# Patient Record
Sex: FEMALE | Race: OTHER | HISPANIC OR LATINO | URBAN - METROPOLITAN AREA
[De-identification: names, ages, dates, MRNs, and addresses within clinical notes are randomized per-mention and may not be internally consistent; named-entity substitution may affect disease eponyms.]

---
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
## 2023-06-30 ENCOUNTER — APPOINTMENT (OUTPATIENT)
Dept: ORTHOPEDIC SURGERY | Facility: CLINIC | Age: 23
End: 2023-06-30
Payer: COMMERCIAL

## 2023-06-30 ENCOUNTER — TRANSCRIPTION ENCOUNTER (OUTPATIENT)
Age: 23
End: 2023-06-30

## 2023-06-30 VITALS — BODY MASS INDEX: 18.43 KG/M2 | WEIGHT: 104 LBS | HEIGHT: 63 IN

## 2023-06-30 PROCEDURE — 72100 X-RAY EXAM L-S SPINE 2/3 VWS: CPT

## 2023-06-30 PROCEDURE — 73502 X-RAY EXAM HIP UNI 2-3 VIEWS: CPT

## 2023-06-30 PROCEDURE — 99214 OFFICE O/P EST MOD 30 MIN: CPT

## 2023-06-30 NOTE — HISTORY OF PRESENT ILLNESS
[8] : 8 [5] : 5 [Dull/Aching] : dull/aching [Localized] : localized [Constant] : constant [Standing] : standing [Walking] : walking [Stairs] : stairs [Student] : Work status: student [de-identified] : 23F here with low back and bilateral hip pain. She states pain has been present since Jan 2023. She has pain in her groin region. She hears clicking in the groins. She has a hx of lumbar issues.\par \par  [] : Post Surgical Visit: no [FreeTextEntry1] : gisella hips [FreeTextEntry5] : Patient complains of gisella hip pain since january 2023, has pain with walkings, has clicking, states it pops in and out when she is walking, pain getting worse. has back pain, h/o stress fracture in the back in the past

## 2023-06-30 NOTE — IMAGING
[de-identified] : Back / Spine:\par Inspection: No erythema and ecchymosis.\par Palpation: b/l lumbar paraspinal tenderness. \par Range of motion:  Near full range of motion but with mild stiffness. \par Strength Testing: motor exam is 5/5 throughout both lower extremities with normal tone\par Neurological testing: light touch is intact throughout both lower extremities\par Straight Leg Raise: neg\par Babiniski test: neg bilaterally\par \par \par Land R hip\par no swelling or deformity\par pain in groin and GT region\par + impingement testing

## 2023-07-12 ENCOUNTER — APPOINTMENT (OUTPATIENT)
Dept: ORTHOPEDIC SURGERY | Facility: CLINIC | Age: 23
End: 2023-07-12
Payer: COMMERCIAL

## 2023-07-12 VITALS — WEIGHT: 104 LBS | HEIGHT: 63 IN | BODY MASS INDEX: 18.43 KG/M2

## 2023-07-12 PROCEDURE — 99214 OFFICE O/P EST MOD 30 MIN: CPT

## 2023-07-12 NOTE — HISTORY OF PRESENT ILLNESS
[Sudden] : sudden [6] : 6 [3] : 3 [Dull/Aching] : dull/aching [Throbbing] : throbbing [Frequent] : frequent [Meds] : meds [de-identified] : This is Ms. ROSS BONE  a 23 year old female who comes in today complaining of bilateral hip pain since Jan 2023 with no injury.  She saw [facility]AS on 6/30/23 at our urgent care.  Pain is Bl hip lateral pain and tightness. Feels like the hip wants to pop out randomly while walking. Tried acupuncture/ stretching with no help. Has felt somewhat better recently as she has been resting more. 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 [] : no [FreeTextEntry9] : advil/stretching [de-identified] : [facility] [de-identified] : xrays

## 2023-07-12 NOTE — DISCUSSION/SUMMARY
[de-identified] : hip impingement, external snapping, mild dysplasia\par Discussed Physical therapy as the best treatment option. Discussed injections in future if physical therapy fails to reduce pain level.\par \par -----------------------------------------------\par \par The patient was advised of the diagnosis. The natural history of the pathology was explained in full. All questions were answered. The risks and benefits of conservative and interventional treatment alternatives were explained to the patient.\par

## 2023-07-12 NOTE — IMAGING
[de-identified] : \par ----------------------------------------------------------------------------\par \par Bilateral hip exam: \par \par Inspection: no deformity, no swelling, no gross limb length discrepancy\par ROM: \par    Flexion: 120\par    ER: 50\par    IR: 20\par Tenderness: \par    (+) Groin tenderness\par    (-) Greater trochanteric tenderness\par    (-) Buttock tenderness\par    (-) IT Band tenderness\par    (-) Anterior thigh tenderness\par    (-) ASIS tenderness\par    (-) Ischial tuberosity\par    (-) Hamstring muscle tenderness\par + hip abductors \par \par Additional tests: +mild lateral snapping\par    + FADIR\par    (-) VARINDER\par    (-) Resisted hip flexion pain\par    (-) Apprehension (external rotation/extension)\par    (-) Posterior pain with forced hip flexion and knee extension\par    (-) Tight hamstrings\par    (-) Log roll\par    (-) Axial load\par Strength: 5/5 IP/Q/H/TA/GS/EHL\par Neuro: In tact to light touch throughout, DTR's wnl\par Vascularity: Extremity warm and well perfused\par Gait: normal.  [Bilateral] : hip with pelvis bilaterally [All Views] : anteroposterior, lateral [FreeTextEntry9] : LCEA R - 19.6, L 22.3\par +bilateral crossover sign\par +posterior wall sign

## 2023-08-10 ENCOUNTER — APPOINTMENT (OUTPATIENT)
Dept: ORTHOPEDIC SURGERY | Facility: CLINIC | Age: 23
End: 2023-08-10
Payer: COMMERCIAL

## 2023-08-10 VITALS
HEART RATE: 66 BPM | DIASTOLIC BLOOD PRESSURE: 63 MMHG | HEIGHT: 63 IN | WEIGHT: 102 LBS | TEMPERATURE: 97.4 F | SYSTOLIC BLOOD PRESSURE: 99 MMHG | BODY MASS INDEX: 18.07 KG/M2 | OXYGEN SATURATION: 98 %

## 2023-08-10 PROCEDURE — 99203 OFFICE O/P NEW LOW 30 MIN: CPT

## 2023-08-14 ENCOUNTER — APPOINTMENT (OUTPATIENT)
Dept: ORTHOPEDIC SURGERY | Facility: CLINIC | Age: 23
End: 2023-08-14
Payer: COMMERCIAL

## 2023-08-14 VITALS — BODY MASS INDEX: 18.07 KG/M2 | WEIGHT: 102 LBS | HEIGHT: 63 IN

## 2023-08-14 PROCEDURE — 99213 OFFICE O/P EST LOW 20 MIN: CPT

## 2023-08-14 NOTE — DISCUSSION/SUMMARY
[de-identified] : hip impingement, external snapping, mild dysplasia MRI to eval for tear BL hip rtc p MRI  ----------------------------------------------------------------------------  All relevant imaging studies pertinent to today's visit, including x-rays, MRI's and/or other advanced imaging studies (CT/etc) were independently interpreted and reviewed with the patient as needed. Implications of the studies together with the patient's clinical picture were discussed to formulate a working diagnosis and management options were detailed.  The patient was advised of the diagnosis.  The natural history of the pathology was explained in full. All questions were answered.  The risks and benefits of conservative and interventional treatment alternatives were explained to the patient   -----------------------------------------------  The patient was advised that an advanced diagnostic/imaging study (MRI/CT/etc) will be ordered to evaluate potential pathology in the affected area(s). The patient was further advised to follow up in the office to review the results of the study and determine further management that may be indicated.

## 2023-08-14 NOTE — HISTORY OF PRESENT ILLNESS
[Sudden] : sudden [9] : 9 [7] : 7 [Dull/Aching] : dull/aching [Throbbing] : throbbing [Frequent] : frequent [Meds] : meds [de-identified] : This is Ms. ROSS BONE  a 23 year old female who comes in today complaining of bilateral hip pain since Jan 2023 with no injury.  She saw [facility]AS on 6/30/23 at our urgent care.  Pain is Bl hip lateral pain and tightness. Feels like the hip wants to pop out randomly while walking. Tried acupuncture/ stretching with no help. Has felt somewhat better recently as she has been resting more.  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 [] : no [FreeTextEntry9] : advil/stretching [de-identified] : [facility] [de-identified] : xrays [de-identified] : physical therapy

## 2023-08-14 NOTE — IMAGING
[Bilateral] : hip with pelvis bilaterally [All Views] : anteroposterior, lateral [de-identified] : ----------------------------------------------------------------------------  Bilateral hip exam:   Inspection: no deformity, no swelling, no gross limb length discrepancy ROM:     Flexion: 120    ER: 50    IR: 20 Tenderness:     (+) Groin tenderness    (-) Greater trochanteric tenderness    (-) Buttock tenderness    (-) IT Band tenderness    (-) Anterior thigh tenderness    (-) ASIS tenderness    (-) Ischial tuberosity    (-) Hamstring muscle tenderness + hip abductors   Additional tests: +mild lateral snapping    + FADIR    (-) VARINDER    (-) Resisted hip flexion pain    (-) Apprehension (external rotation/extension)    (-) Posterior pain with forced hip flexion and knee extension    (-) Tight hamstrings    (-) Log roll    (-) Axial load Strength: 5/5 IP/Q/H/TA/GS/EHL Neuro: In tact to light touch throughout, DTR's wnl Vascularity: Extremity warm and well perfused Gait: normal.  [FreeTextEntry9] : LCEA R - 19.6, L 22.3\par  +bilateral crossover sign\par  +posterior wall sign

## 2023-08-14 NOTE — REASON FOR VISIT
[FreeTextEntry2] : follow up bilateral hips. Has been doing PT and taking mobic but not seeing improvement. Continues to get sx where hip wants to give out on her and occasional nighttime pain.

## 2023-08-18 ENCOUNTER — APPOINTMENT (OUTPATIENT)
Dept: MRI IMAGING | Facility: CLINIC | Age: 23
End: 2023-08-18
Payer: COMMERCIAL

## 2023-08-18 PROCEDURE — 73721 MRI JNT OF LWR EXTRE W/O DYE: CPT | Mod: LT

## 2023-08-18 PROCEDURE — 73721 MRI JNT OF LWR EXTRE W/O DYE: CPT | Mod: RT

## 2023-08-23 ENCOUNTER — APPOINTMENT (OUTPATIENT)
Dept: ORTHOPEDIC SURGERY | Facility: CLINIC | Age: 23
End: 2023-08-23
Payer: COMMERCIAL

## 2023-08-23 VITALS — BODY MASS INDEX: 18.07 KG/M2 | HEIGHT: 63 IN | WEIGHT: 102 LBS

## 2023-08-23 PROCEDURE — 20611 DRAIN/INJ JOINT/BURSA W/US: CPT | Mod: RT

## 2023-08-23 PROCEDURE — 99214 OFFICE O/P EST MOD 30 MIN: CPT | Mod: 25

## 2023-08-23 NOTE — HISTORY OF PRESENT ILLNESS
[Sudden] : sudden [9] : 9 [7] : 7 [Dull/Aching] : dull/aching [Throbbing] : throbbing [Frequent] : frequent [Meds] : meds [de-identified] : This is Ms. ROSS BONE  a 23 year old female who comes in today complaining of bilateral hip pain since Jan 2023 with no injury.  She saw [facility]AS on 6/30/23 at our urgent care.  Pain is Bl hip lateral pain and tightness. Feels like the hip wants to pop out randomly while walking. Tried acupuncture/ stretching with no help. Has felt somewhat better recently as she has been resting more.  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 [] : no [FreeTextEntry9] : advil/stretching [de-identified] : [facility] [de-identified] : xrays [de-identified] : MRIs

## 2023-08-23 NOTE — DATA REVIEWED
[MRI] : MRI [Left] : left [Hip] : hip [I independently reviewed and interpreted images and report] : I independently reviewed and interpreted images and report [FreeTextEntry1] : cam deformity, dysplasia [FreeTextEntry2] : cam deformity, dysplasia, subtle labral tear

## 2023-08-23 NOTE — DISCUSSION/SUMMARY
[de-identified] : hip impingement, external snapping, mild dysplasia reviewed mri. discussed options. plan for bilateral hip corticosteroid injections and PT as patient states her discomfort is significant  ----------------------------------------------------------------------------  All relevant imaging studies pertinent to today's visit, including x-rays, MRI's and/or other advanced imaging studies (CT/etc) were independently interpreted and reviewed with the patient as needed. Implications of the studies together with the patient's clinical picture were discussed to formulate a working diagnosis and management options were detailed.  The patient was advised of the diagnosis.  The natural history of the pathology was explained in full. All questions were answered.  The risks and benefits of conservative and interventional treatment alternatives were explained to the patient   ----------------------------------------------------------------------------  Large joint corticosteroid injection given: Right hip IAC under ultrasound guidance  Patient indicated for injection after trial of rest, OTC medications including aspirin, Ibuprofen, Aleve etc or prescription NSAIDS, and/or exercises at home and/ or physical therapy without satisfactory response.  Patient has symptoms including pain, swelling, and/or decreased mobility in the joint. The risks, benefits, and alternatives to corticosteroid injection were explained in full to the patient, including but not limited to infection, sepsis, bleeding, scarring, skin discoloration, temporary increase in pain, syncopal episode, failure to resolve symptoms, allergic reaction, symptom recurrence, and elevation of blood sugar in diabetics. Patient understood the risks. All questions were answered. After discussion of options, patient requested an injection.   Oral informed consent was obtained and sterile technique was utilized for the procedure including the preparation of the solutions used for the injection and betadine followed by alcohol prep to the injection site. Anesthesia was given with ethyl chloride sprayed topically. The injection was delivered. Patient tolerated the procedure well.   Post Procedure Instructions: Patient was advised to call if redness, pain, or fever occur and apply ice for 15 min on and 15 min off later today  Medications delivered: Kenalog 10 mg, Lidocaine: 4 cc

## 2023-08-23 NOTE — IMAGING
[Bilateral] : hip with pelvis bilaterally [All Views] : anteroposterior, lateral [de-identified] : ----------------------------------------------------------------------------  Bilateral hip exam:   Inspection: no deformity, no swelling, no gross limb length discrepancy ROM:     Flexion: 120    ER: 50    IR: 20 Tenderness:     (+) Groin tenderness    (-) Greater trochanteric tenderness    (-) Buttock tenderness    (-) IT Band tenderness    (-) Anterior thigh tenderness    (-) ASIS tenderness    (-) Ischial tuberosity    (-) Hamstring muscle tenderness + hip abductors   Additional tests: +mild lateral snapping    + FADIR    (-) VARINDER    (-) Resisted hip flexion pain    (-) Apprehension (external rotation/extension)    (-) Posterior pain with forced hip flexion and knee extension    (-) Tight hamstrings    (-) Log roll    (-) Axial load Strength: 5/5 IP/Q/H/TA/GS/EHL Neuro: In tact to light touch throughout, DTR's wnl Vascularity: Extremity warm and well perfused Gait: normal.  [FreeTextEntry9] : LCEA R - 19.6, L 22.3\par  +bilateral crossover sign\par  +posterior wall sign

## 2023-09-13 ENCOUNTER — APPOINTMENT (OUTPATIENT)
Dept: ORTHOPEDIC SURGERY | Facility: CLINIC | Age: 23
End: 2023-09-13
Payer: COMMERCIAL

## 2023-09-13 VITALS — BODY MASS INDEX: 18.07 KG/M2 | WEIGHT: 102 LBS | HEIGHT: 63 IN

## 2023-09-13 PROCEDURE — 99213 OFFICE O/P EST LOW 20 MIN: CPT | Mod: 25

## 2023-09-27 ENCOUNTER — APPOINTMENT (OUTPATIENT)
Dept: ORTHOPEDIC SURGERY | Facility: CLINIC | Age: 23
End: 2023-09-27
Payer: COMMERCIAL

## 2023-09-27 VITALS — BODY MASS INDEX: 18.07 KG/M2 | WEIGHT: 102 LBS | HEIGHT: 63 IN

## 2023-09-27 PROCEDURE — 99213 OFFICE O/P EST LOW 20 MIN: CPT

## 2023-09-27 PROCEDURE — 73562 X-RAY EXAM OF KNEE 3: CPT | Mod: 50

## 2023-10-19 ENCOUNTER — APPOINTMENT (OUTPATIENT)
Dept: PEDIATRIC ORTHOPEDIC SURGERY | Facility: CLINIC | Age: 23
End: 2023-10-19
Payer: COMMERCIAL

## 2023-10-19 VITALS — WEIGHT: 99 LBS | BODY MASS INDEX: 17.54 KG/M2 | HEIGHT: 63 IN

## 2023-10-19 PROCEDURE — 99214 OFFICE O/P EST MOD 30 MIN: CPT

## 2023-11-01 ENCOUNTER — APPOINTMENT (OUTPATIENT)
Dept: RHEUMATOLOGY | Facility: CLINIC | Age: 23
End: 2023-11-01
Payer: COMMERCIAL

## 2023-11-01 VITALS
WEIGHT: 99 LBS | SYSTOLIC BLOOD PRESSURE: 109 MMHG | HEART RATE: 93 BPM | DIASTOLIC BLOOD PRESSURE: 70 MMHG | HEIGHT: 63 IN | BODY MASS INDEX: 17.54 KG/M2 | OXYGEN SATURATION: 95 %

## 2023-11-01 PROCEDURE — 99204 OFFICE O/P NEW MOD 45 MIN: CPT

## 2023-11-03 ENCOUNTER — APPOINTMENT (OUTPATIENT)
Dept: MRI IMAGING | Facility: CLINIC | Age: 23
End: 2023-11-03
Payer: COMMERCIAL

## 2023-11-03 ENCOUNTER — OUTPATIENT (OUTPATIENT)
Dept: OUTPATIENT SERVICES | Facility: HOSPITAL | Age: 23
LOS: 1 days | End: 2023-11-03
Payer: COMMERCIAL

## 2023-11-03 DIAGNOSIS — M25.551 PAIN IN RIGHT HIP: ICD-10-CM

## 2023-11-03 PROCEDURE — 72148 MRI LUMBAR SPINE W/O DYE: CPT | Mod: 26

## 2023-11-03 PROCEDURE — 72148 MRI LUMBAR SPINE W/O DYE: CPT

## 2023-11-29 ENCOUNTER — APPOINTMENT (OUTPATIENT)
Dept: ORTHOPEDIC SURGERY | Facility: CLINIC | Age: 23
End: 2023-11-29
Payer: COMMERCIAL

## 2023-11-29 VITALS — WEIGHT: 99 LBS | BODY MASS INDEX: 17.54 KG/M2 | HEIGHT: 63 IN

## 2023-11-29 DIAGNOSIS — M22.2X2 PATELLOFEMORAL DISORDERS, LEFT KNEE: ICD-10-CM

## 2023-11-29 DIAGNOSIS — M22.2X1 PATELLOFEMORAL DISORDERS, RIGHT KNEE: ICD-10-CM

## 2023-11-29 DIAGNOSIS — G89.29 PAIN IN UNSPECIFIED JOINT: ICD-10-CM

## 2023-11-29 DIAGNOSIS — M25.50 PAIN IN UNSPECIFIED JOINT: ICD-10-CM

## 2023-11-29 PROCEDURE — 99213 OFFICE O/P EST LOW 20 MIN: CPT

## 2023-11-29 RX ORDER — DICLOFENAC SODIUM 1% 10 MG/G
1 GEL TOPICAL
Qty: 1 | Refills: 2 | Status: ACTIVE | COMMUNITY
Start: 2023-11-29 | End: 1900-01-01

## 2023-12-25 LAB
ALBUMIN SERPL ELPH-MCNC: 4.7 G/DL
ALP BLD-CCNC: 68 U/L
ALT SERPL-CCNC: 17 U/L
ANA SER IF-ACNC: NEGATIVE
ANION GAP SERPL CALC-SCNC: 11 MMOL/L
AST SERPL-CCNC: 19 U/L
BASOPHILS # BLD AUTO: 0.05 K/UL
BASOPHILS NFR BLD AUTO: 0.6 %
BILIRUB SERPL-MCNC: 0.2 MG/DL
BUN SERPL-MCNC: 14 MG/DL
CALCIUM SERPL-MCNC: 9.4 MG/DL
CCP AB SER IA-ACNC: <8 UNITS
CHLORIDE SERPL-SCNC: 100 MMOL/L
CO2 SERPL-SCNC: 27 MMOL/L
CREAT SERPL-MCNC: 0.6 MG/DL
CRP SERPL-MCNC: <3 MG/L
EGFR: 131 ML/MIN/1.73M2
EOSINOPHIL # BLD AUTO: 0.13 K/UL
EOSINOPHIL NFR BLD AUTO: 1.6 %
ERYTHROCYTE [SEDIMENTATION RATE] IN BLOOD BY WESTERGREN METHOD: 4 MM/HR
HCT VFR BLD CALC: 41.5 %
HGB BLD-MCNC: 13.7 G/DL
IMM GRANULOCYTES NFR BLD AUTO: 0.2 %
LYMPHOCYTES # BLD AUTO: 2.74 K/UL
LYMPHOCYTES NFR BLD AUTO: 32.8 %
MAN DIFF?: NORMAL
MCHC RBC-ENTMCNC: 29.7 PG
MCHC RBC-ENTMCNC: 33 GM/DL
MCV RBC AUTO: 90 FL
MONOCYTES # BLD AUTO: 0.4 K/UL
MONOCYTES NFR BLD AUTO: 4.8 %
NEUTROPHILS # BLD AUTO: 5.02 K/UL
NEUTROPHILS NFR BLD AUTO: 60 %
PLATELET # BLD AUTO: 231 K/UL
POTASSIUM SERPL-SCNC: 3.7 MMOL/L
PROT SERPL-MCNC: 7 G/DL
RBC # BLD: 4.61 M/UL
RBC # FLD: 12.3 %
RF+CCP IGG SER-IMP: NEGATIVE
RHEUMATOID FACT SER QL: <10 IU/ML
SODIUM SERPL-SCNC: 138 MMOL/L
WBC # FLD AUTO: 8.36 K/UL

## 2024-01-07 ENCOUNTER — NON-APPOINTMENT (OUTPATIENT)
Age: 24
End: 2024-01-07

## 2024-01-11 ENCOUNTER — APPOINTMENT (OUTPATIENT)
Dept: RHEUMATOLOGY | Facility: CLINIC | Age: 24
End: 2024-01-11

## 2024-01-23 ENCOUNTER — APPOINTMENT (OUTPATIENT)
Dept: ULTRASOUND IMAGING | Facility: CLINIC | Age: 24
End: 2024-01-23
Payer: COMMERCIAL

## 2024-01-23 ENCOUNTER — OUTPATIENT (OUTPATIENT)
Dept: OUTPATIENT SERVICES | Facility: HOSPITAL | Age: 24
LOS: 1 days | End: 2024-01-23
Payer: COMMERCIAL

## 2024-01-23 DIAGNOSIS — M24.80 OTHER SPECIFIC JOINT DERANGEMENTS OF UNSPECIFIED JOINT, NOT ELSEWHERE CLASSIFIED: ICD-10-CM

## 2024-01-23 DIAGNOSIS — Z00.8 ENCOUNTER FOR OTHER GENERAL EXAMINATION: ICD-10-CM

## 2024-01-23 DIAGNOSIS — M25.551 PAIN IN RIGHT HIP: ICD-10-CM

## 2024-01-23 PROCEDURE — 76881 US COMPL JOINT R-T W/IMG: CPT

## 2024-01-23 PROCEDURE — 76881 US COMPL JOINT R-T W/IMG: CPT | Mod: 26,RT

## 2024-03-26 ENCOUNTER — NON-APPOINTMENT (OUTPATIENT)
Age: 24
End: 2024-03-26

## 2024-03-27 ENCOUNTER — APPOINTMENT (OUTPATIENT)
Dept: RHEUMATOLOGY | Facility: CLINIC | Age: 24
End: 2024-03-27
Payer: COMMERCIAL

## 2024-03-27 VITALS
TEMPERATURE: 98.6 F | OXYGEN SATURATION: 96 % | SYSTOLIC BLOOD PRESSURE: 108 MMHG | HEIGHT: 63 IN | DIASTOLIC BLOOD PRESSURE: 68 MMHG

## 2024-03-27 DIAGNOSIS — M25.851 OTHER SPECIFIED JOINT DISORDERS, RIGHT HIP: ICD-10-CM

## 2024-03-27 DIAGNOSIS — M24.80 OTHER SPECIFIC JOINT DERANGEMENTS OF UNSPECIFIED JOINT, NOT ELSEWHERE CLASSIFIED: ICD-10-CM

## 2024-03-27 DIAGNOSIS — M25.852 OTHER SPECIFIED JOINT DISORDERS, LEFT HIP: ICD-10-CM

## 2024-03-27 PROCEDURE — G2212 PROLONG OUTPT/OFFICE VIS: CPT

## 2024-03-27 PROCEDURE — 36415 COLL VENOUS BLD VENIPUNCTURE: CPT

## 2024-03-27 PROCEDURE — 99417 PROLNG OP E/M EACH 15 MIN: CPT

## 2024-03-27 PROCEDURE — 99215 OFFICE O/P EST HI 40 MIN: CPT

## 2024-03-27 PROCEDURE — 99205 OFFICE O/P NEW HI 60 MIN: CPT

## 2024-03-27 RX ORDER — HYDROXYZINE HYDROCHLORIDE 50 MG/1
TABLET ORAL
Refills: 0 | Status: ACTIVE | COMMUNITY

## 2024-03-27 RX ORDER — MELOXICAM 7.5 MG/1
7.5 TABLET ORAL DAILY
Qty: 30 | Refills: 0 | Status: DISCONTINUED | COMMUNITY
Start: 2023-08-10 | End: 2024-03-27

## 2024-03-27 RX ORDER — UBIDECARENONE/VIT E ACET 100MG-5
CAPSULE ORAL
Refills: 0 | Status: ACTIVE | COMMUNITY

## 2024-03-27 RX ORDER — MAGNESIUM OXIDE/MAG AA CHELATE 300 MG
CAPSULE ORAL
Refills: 0 | Status: ACTIVE | COMMUNITY

## 2024-03-27 RX ORDER — MULTIVIT-MIN/IRON/FOLIC ACID/K 18-600-40
CAPSULE ORAL
Refills: 0 | Status: ACTIVE | COMMUNITY

## 2024-03-27 RX ORDER — GLUCOSAMINE SULFATE 500 MG
CAPSULE ORAL
Refills: 0 | Status: ACTIVE | COMMUNITY

## 2024-03-27 RX ORDER — MULTIVITAMIN
TABLET ORAL
Refills: 0 | Status: ACTIVE | COMMUNITY

## 2024-03-27 RX ORDER — ETODOLAC 500 MG/1
500 TABLET, FILM COATED, EXTENDED RELEASE ORAL
Qty: 60 | Refills: 2 | Status: ACTIVE | COMMUNITY
Start: 2024-03-27 | End: 1900-01-01

## 2024-03-27 RX ORDER — PHYTONADIONE (VIT K1) 100 MCG
TABLET ORAL
Refills: 0 | Status: ACTIVE | COMMUNITY

## 2024-03-29 PROBLEM — M25.852 LEFT HIP IMPINGEMENT SYNDROME: Status: ACTIVE | Noted: 2023-06-30

## 2024-03-29 PROBLEM — M25.851 RIGHT HIP IMPINGEMENT SYNDROME: Status: ACTIVE | Noted: 2023-06-30

## 2024-03-29 PROBLEM — M24.80 GENERALIZED HYPERMOBILITY OF JOINTS: Status: ACTIVE | Noted: 2023-10-19

## 2024-04-03 ENCOUNTER — RX RENEWAL (OUTPATIENT)
Age: 24
End: 2024-04-03

## 2024-05-22 ENCOUNTER — APPOINTMENT (OUTPATIENT)
Dept: RHEUMATOLOGY | Facility: CLINIC | Age: 24
End: 2024-05-22
Payer: COMMERCIAL

## 2024-05-22 VITALS
RESPIRATION RATE: 17 BRPM | DIASTOLIC BLOOD PRESSURE: 70 MMHG | TEMPERATURE: 96.4 F | HEART RATE: 81 BPM | OXYGEN SATURATION: 99 % | SYSTOLIC BLOOD PRESSURE: 122 MMHG | HEIGHT: 63 IN

## 2024-05-22 PROCEDURE — 99215 OFFICE O/P EST HI 40 MIN: CPT

## 2024-05-22 PROCEDURE — G2212 PROLONG OUTPT/OFFICE VIS: CPT

## 2024-05-22 PROCEDURE — 99417 PROLNG OP E/M EACH 15 MIN: CPT

## 2024-05-22 RX ORDER — OXCARBAZEPINE 300 MG/1
300 TABLET, FILM COATED ORAL
Refills: 0 | Status: ACTIVE | COMMUNITY

## 2024-05-22 RX ORDER — FLUVOXAMINE MALEATE 50 MG/1
50 TABLET ORAL
Refills: 0 | Status: ACTIVE | COMMUNITY

## 2024-05-23 NOTE — PHYSICAL EXAM
[Right] : Right: N [Left] : Left: N [Total Score ___] : Total Score = [unfilled] [>= 5 pubertal men and women to age 50] : >= 5 pubertal men and women to age 50 [Beighton Score: ___/ 9] : Beighton Score: [unfilled]/ 9 [Can you now (or could you ever) place your hands flat on the floor without bending your knees?] : Patient can (or previously) place hands flat on the floor without bending their knees [Can you now (or could you ever) bend your thumb to touch your forearm?] : Patient can (or previously) bend their thumb to touch their forearm [As a child, did you amuse  your friends by contorting your body into strange shapes or could you do the splits?] : Patient, as a child, was able to contort body or perform splits [As a child or teenager, did your shoulder or knee cap dislocate on more than one occasion?] : Patient, as a child, was able to contort body or perform splits [Unusually soft or velvety skin] : Unusually soft or velvety skin [Mild skin hyperextensibility] : Mild skin hyperextensibility [Unexplained striae distensae or rubae at the back, groins, thighs, breasts and/or abdomen in adolescents,] : Unexplained striae distensae or rubae at the back, groins, thighs, breasts and/or abdomen in adolescents, men or pre-pubertal women without a history significant gain or loss of body fat or weight [Bilateral piezogenic papules of the heel] : Bilateral piezogenic papules of the heel [Arachnodactyly, as defined in one or more of the following: (i) positive wrist sign (Yaakov sign) on both sides,] : Arachnodactyly, as defined in one or more of the following: (i) positive wrist sign (Yaakov sign) on both sides, (ii) positive thumb sign (Walker sign) on both sides [Feature A Total: ___/ 12] : Feature A Total: [unfilled]/ 12 [Musculoskeletal pain in two or more limbs, recurring daily for at least 3 months] : Musculoskeletal pain in two or more limbs, recurring daily for at least 3 months [Chronic, widespread pain for 3 months] : Chronic, widespread pain for 3 months [Recurrent joint dislocations or nelsy joint instability, in the absence of trauma] : Recurrent joint dislocations or nelsy joint instability, in the absence of trauma [Absence of unusual skin fragility, which should prompt consideration of other types of EDS] : 1. Absence of unusual skin fragility, which should prompt consideration of other types of EDS [Exclusion of other heritable and acquired connective tissue disorders, including autoimmune rheumatologic] : 2. Exclusion of other heritable and acquired connective tissue disorders, including autoimmune rheumatologic conditions. In patients with an acquired CTD (e.g. Lupus, Rheumatoid Arthritis, etc.), additional diagnosis of hEDS requires meeting both Features A and B of Criterion 2. Feature C of Criterion 2 (chronic pain and/or instability) cannot be counted toward a diagnosis of hEDS in this situation [Exclusion of alternative diagnoses that may also include joint hypermobility by means of hypotonia and/or] : 3. Exclusion of alternative diagnoses that may also include joint hypermobility by means of hypotonia and/or connective tissue laxity. Alternative diagnoses and diagnostic categories include, but are not limited to, neuromuscular disorders (e.g. [place] myopathy), other hereditary disorders of the connective tissue (e.g. other types of EDS, Loeys-Maame syndrome, Marfan syndrome), and skeletal dysplasias (e.g. osteogenesis imperfecta). Exclusion of these considerations may be based upon history, physical examination, and/or molecular genetic testing, as indicated. [Bilateral] : bilateral positive [Mild] : mild [No] : No [FreeTextEntry4] : 160.02 [FreeTextEntry5] : 79 [FreeTextEntry6] : 158 [de-identified] : 0.987 [FreeTextEntry2] : - Has myopia (Rx -3.5 OD, -5.5 OS) with astigmatism.  -  [FreeTextEntry8] : 82 [General Appearance - Alert] : alert [General Appearance - In No Acute Distress] : in no acute distress [Sclera] : the sclera and conjunctiva were normal [PERRL With Normal Accommodation] : pupils were equal in size, round, and reactive to light [Extraocular Movements] : extraocular movements were intact [Outer Ear] : the ears and nose were normal in appearance [Oropharynx] : the oropharynx was normal [Neck Appearance] : the appearance of the neck was normal [No CVA Tenderness] : no ~M costovertebral angle tenderness [] : no rash [No Focal Deficits] : no focal deficits [Oriented To Time, Place, And Person] : oriented to person, place, and time [Nail Clubbing] : no clubbing  or cyanosis of the fingernails [Musculoskeletal - Swelling] : no joint swelling seen [FreeTextEntry1] : Hands- Bilat #5 digit >90 degree extension. oppositional thumb extension to ipsilateral forearm. +Yaakov sign B/L, +Walker sign B/L.  Wrists- B/L laxity Elbows- R>L forearm >150 degree extension Shoulders- B/L laxity. L shoulder subluxation.  Hips- B/L laxity  Knees- normal  Ankles- B/L laxity Feet- normal

## 2024-05-23 NOTE — ASSESSMENT
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  PMHx of B/L hip dysphagia, B/L hip impingement syndrome, R>L hip snapping, and generalized hypermobility, mild scoliosis, PCOS and autism, presents for an initial evaluation of EDS. She has been experiencing joint hypermobility since childhood, mostly affecting her hips, shoulders, knees and ankles, and reports multiple prior joint dislocations and constant hip subluxations. She also has chronic polyarthralgia mostly affecting her hips, wrists and ankles, progressing over the past 2 years.   The clinical diagnosis of hypermobile EDS needs the simultaneous presence of all criteria, 1 and 2 and 3. CRITERION 1 - Beighton Score: 7/9. [+can place her hands flat on the floor without bending the knees, +can bend her thumb to touch the forearm, +she amuses friends by contorting her body into strange shapes, +as a child or teenager, her shoulder, elbow and hips, dislocated on more than one occasion]  CRITERION 2 - Feature A total: 5/12, Feature B: 0/1, Feature C: 3/3. CRITERION 3 - 3/3. The patient exhibits joint hypermobility on today's exam meeting the criteria for hypermobile EDS. The patient has skin laxity/hyperextensibility as well. The patient reports additional features often associated with hEDS including: bowel symptoms w/ nausea and bloating, heavy and painful menstrual bleeding, chronic GERD, disabling persistent fatigue, autonomic dysfunction symptoms w/ palpitations, and near-syncope due to postural tachycardia/hypotension. The patient's hEDS and cardiac symptoms will be further evaluated with genetic lab testing for evaluation of vascular-type EDS. Negative pathogenic HDCT, VUS COL5A1 only, not supported change in dx.   - HCDT genetic sequencing reviewed for no pathogenic variants or change in Dx. VUS COL5A1 - TTE normal 4/2024. Screen next in 2-3 years - for POTS/dysautonomia- handout emailed. Start increasing po water intake 64-80 oz coupled with salt/electrolyte supplementation for increasing intravascular volume and preventing orthostatic tachycardia. - compression stockings recommended, pt will obtain - c/w PT and discussed features of [place] method. Also discussed avoiding stressors on her joints by encouraging hypermobility such as yoga. - c/w Etodolac 500mg up to BID for mild-moderate joint pain - sent today  - f/u orthopedics for hip dysplasia. May consider arthroscopic debridement  RTO 3 mos RGC

## 2024-05-30 ENCOUNTER — APPOINTMENT (OUTPATIENT)
Dept: PEDIATRIC ORTHOPEDIC SURGERY | Facility: CLINIC | Age: 24
End: 2024-05-30
Payer: COMMERCIAL

## 2024-05-30 DIAGNOSIS — Q65.89 OTHER SPECIFIED CONGENITAL DEFORMITIES OF HIP: ICD-10-CM

## 2024-05-30 DIAGNOSIS — S73.192A OTHER SPRAIN OF LEFT HIP, INITIAL ENCOUNTER: ICD-10-CM

## 2024-05-30 DIAGNOSIS — M24.859 OTHER SPECIFIC JOINT DERANGEMENTS OF UNSPECIFIED HIP, NOT ELSEWHERE CLASSIFIED: ICD-10-CM

## 2024-05-30 DIAGNOSIS — Q79.62 HYPERMOBILE EHLERS-DANLOS SYNDROME: ICD-10-CM

## 2024-05-30 DIAGNOSIS — G89.29 PAIN IN RIGHT HIP: ICD-10-CM

## 2024-05-30 DIAGNOSIS — M25.552 PAIN IN RIGHT HIP: ICD-10-CM

## 2024-05-30 DIAGNOSIS — M25.551 PAIN IN RIGHT HIP: ICD-10-CM

## 2024-05-30 PROCEDURE — 99214 OFFICE O/P EST MOD 30 MIN: CPT

## 2024-05-31 PROBLEM — Q65.89 HIP DYSPLASIA: Status: ACTIVE | Noted: 2023-07-12

## 2024-05-31 PROBLEM — S73.192A TEAR OF LEFT ACETABULAR LABRUM, INITIAL ENCOUNTER: Status: ACTIVE | Noted: 2023-08-23

## 2024-05-31 PROBLEM — M24.859 SNAPPING HIP: Status: ACTIVE | Noted: 2023-07-12

## 2024-05-31 PROBLEM — M25.551 CHRONIC PAIN OF BOTH HIPS: Status: ACTIVE | Noted: 2023-10-19

## 2024-05-31 PROBLEM — Q79.62 HYPERMOBILE EHLERS-DANLOS SYNDROME: Status: ACTIVE | Noted: 2024-03-27

## 2024-06-03 NOTE — ASSESSMENT
[FreeTextEntry1] : This young lady comes today accompanied by her mother regarding the diagnosis of multiple joint arthralgias and hypermobile type César-Danlos syndrome.  Today's visit lasted for approximately 30 minutes with time spent discussing future treatment plans regarding the complaint of bilateral chronic hip pain.   INTERVAL HISTORY:  Hilary returns today accompanied by her mother.  She has been evaluated by Dr. Chamorro who is a  specializing in César-Danlos syndrome and now she has formally been diagnosed with César-Danlos, hypermobile type.  The patient also reports that she has been formally diagnosed with a mild form of autism as well.  The patient reports that she still has had persistent arthralgias.  She has tried alternative modalities including acupuncture.  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  The patient also has evidence of mild acetabular dysplasia and labral pathology, which has raised questions as to what the optimal treatment for her would be.  The patient reports that she still is in the same amount of pain, which limits her functionally day to day and comes today to discuss future treatment plans.  Since the day of the last evaluation, there has been no significant change other than noted above.  Social history is unchanged.    Review of systems today is negative for fever, chills, chest pain, shortness of breath, or rashes.   PHYSICAL EXAMINATION: On examination today, Hilary is in no apparent distress.  She is pleasant and cooperative, and ambulates with no evidence of antalgia.  Focused examination of her right hip demonstrates coxa saltans externa with pain over the greater trochanter.  A limited physical examination was performed today as today we were reviewing treatment modalities available given the underlying diagnosis of César-Danlos.   Imaging studies from the past do demonstrate mild acetabular dysplasia with lateral center edge angle of 21 degrees as well as fraying of the anterosuperior labrum.  Ultrasound imaging was also available for review from dynamic ultrasound imaging of the hip, which failed to yield any evidence of dynamic instability at the level of the hip socket, although it did demonstrate coxa saltans externa.   ASSESSMENT/PLAN: Hilary is a 24-year-old female who has César-Danlos syndrome, hypermobile type.  In addition, the patient also has coxa saltans externa and chronic hip pain.  There is evidence of intra-articular damage with labral pathology.  Today, I had an extensive discussion with Hilary.  I did review the fact that options do exist to treat her pain and creating more of a stability to the hip if this is presumed coming from the hip joint itself, which may involve a periacetabular osteotomy and possible labral repair.  Other than that, potential mechanical irritation over the level of the greater trochanter is also a likely explaining cause of her pain, and could benefit from types of procedures targeted at the iliotibial band, although this is a very controversial topic with release of the iliotibial band, which can precipitate even further instability causing further problems to the hip or even more chronic symptoms of pain.  I have made recommendations for a second opinion from Dr. Kadeem Lange who is also a specialist in hip preservation to offer opinion on this complex situation.  The mother will be in contact with Dr. Lange's office and then we will schedule a phone consultation after that to discuss future treatment plans.  In addition, Hilary also raised concerns over timing of types of procedures given the fact that she has school that she would be attending in September and we will attempt to come up with a treatment plan that keeps this in mind moving forward.  All questions were answered to satisfaction today.  Hilary and her mother expressed understanding and agree.

## 2024-06-10 LAB
MISCELLANEOUS TEST: NORMAL
PROC NAME: NORMAL

## 2024-08-05 ENCOUNTER — APPOINTMENT (OUTPATIENT)
Dept: ORTHOPEDIC SURGERY | Facility: CLINIC | Age: 24
End: 2024-08-05

## 2024-08-05 PROBLEM — A18.01 POTTS DISEASE: Status: RESOLVED | Noted: 2024-08-05 | Resolved: 2024-08-05

## 2024-08-05 PROBLEM — E28.2 PCOS (POLYCYSTIC OVARIAN SYNDROME): Status: RESOLVED | Noted: 2024-08-05 | Resolved: 2024-08-05

## 2024-08-05 PROBLEM — F17.210 LIGHT CIGARETTE SMOKER: Status: ACTIVE | Noted: 2024-08-05

## 2024-08-05 PROCEDURE — 99202 OFFICE O/P NEW SF 15 MIN: CPT

## 2024-08-05 NOTE — DISCUSSION/SUMMARY
[de-identified] : Hilary has César-Danlos syndrome with POTS disease and bilateral hip pain, right worse than left.  She has MRIs demonstrating labral tears and cam deformity.  We discussed her treatment options at length.  We discussed that surgery for her would likely consist of a hip arthroscopy with labral repair and periacetabular osteotomy and possible IT band lengthening.  I would like to get a CT scan to evaluate her femoral version as this may require correction as well.  At this point she is not ready for surgery and would like to finish out the school year.  I counseled her to follow-up with me in March to further discuss surgery.  All questions were answered and she is in agreement with this plan.

## 2024-08-05 NOTE — IMAGING
[de-identified] : General: NAD, A&Ox3 Gait: Non-antalgic, no Trendelenburg Foot Progression: neutral Knee Progression: neutral   B/L Hip Exam: Pain with Log Roll - negative Flexion: 110 Pain with Hyperflexion - yes FADIR - pos VARINDER - neg Extension: 20 Flexion Contracture - none Prone Apprehension Test - neg Prone Rotation Test - symmetric Ischial tenderness - none Trochanteric tenderness - none   Abduction - 4 /5, pain - yes Adduction - 5 /5 Supine resisted SLR - 5 /5, pain - no Seated resisted hip flexion - 5 /5, pain - no Dorsiflexion 5/5 Plantarflexion 5/5 EHL 5/5 DP/PT 2+, foot is warm and well perfused        Leg Lengths: symmetric

## 2024-08-05 NOTE — DATA REVIEWED
[MRI] : MRI [Bilateral] : of the bilateral [Hip] : hip [I independently reviewed and interpreted images and report] : I independently reviewed and interpreted images and report

## 2024-08-05 NOTE — HISTORY OF PRESENT ILLNESS
[9] : 9 [6] : 6 [Dull/Aching] : dull/aching [Sharp] : sharp [Constant] : constant [Household chores] : household chores [Social interactions] : social interactions [Meds] : meds [Physical therapy] : physical therapy [Injection therapy] : injection therapy [Nothing helps with pain getting better] : Nothing helps with pain getting better [Standing] : standing [Walking] : walking [Student] : Work status: student [de-identified] : ROSS BONE is a 24 year female here with bilateral hip pain. Pain has been present for years but has gotten worse and is located laterally.  Rates pain as 8/10 on pain scale.   Injury - denies  Mechanical symptoms - clicking  Exacerbating factors/activities/positions - stairs  Back pain - has history of Dodson disease, history of PCOS  Radicular pain - denies    Previous Treatment: tried acupuncture, voltaren, marijuana, lidocaine patches  NSAIDs: yes  PT: yes  Surgery: denies    Injections: yes   [] : no [FreeTextEntry1] : B/L HIP

## 2024-08-14 ENCOUNTER — APPOINTMENT (OUTPATIENT)
Dept: RHEUMATOLOGY | Facility: CLINIC | Age: 24
End: 2024-08-14

## 2024-08-14 VITALS
DIASTOLIC BLOOD PRESSURE: 50 MMHG | BODY MASS INDEX: 20.14 KG/M2 | SYSTOLIC BLOOD PRESSURE: 90 MMHG | RESPIRATION RATE: 17 BRPM | TEMPERATURE: 98.7 F | HEIGHT: 60 IN | HEART RATE: 90 BPM | OXYGEN SATURATION: 97 % | WEIGHT: 102.56 LBS

## 2024-08-14 DIAGNOSIS — Q79.62 HYPERMOBILE EHLERS-DANLOS SYNDROME: ICD-10-CM

## 2024-08-14 DIAGNOSIS — Q65.89 OTHER SPECIFIED CONGENITAL DEFORMITIES OF HIP: ICD-10-CM

## 2024-08-14 PROCEDURE — 99215 OFFICE O/P EST HI 40 MIN: CPT

## 2024-09-21 ENCOUNTER — NON-APPOINTMENT (OUTPATIENT)
Age: 24
End: 2024-09-21

## 2024-09-23 ENCOUNTER — NON-APPOINTMENT (OUTPATIENT)
Age: 24
End: 2024-09-23

## 2025-01-02 ENCOUNTER — APPOINTMENT (OUTPATIENT)
Dept: PEDIATRIC ORTHOPEDIC SURGERY | Facility: CLINIC | Age: 25
End: 2025-01-02
Payer: COMMERCIAL

## 2025-01-02 DIAGNOSIS — M25.551 PAIN IN RIGHT HIP: ICD-10-CM

## 2025-01-02 DIAGNOSIS — G89.29 PAIN IN RIGHT HIP: ICD-10-CM

## 2025-01-02 DIAGNOSIS — M24.859 OTHER SPECIFIC JOINT DERANGEMENTS OF UNSPECIFIED HIP, NOT ELSEWHERE CLASSIFIED: ICD-10-CM

## 2025-01-02 DIAGNOSIS — M25.552 PAIN IN RIGHT HIP: ICD-10-CM

## 2025-01-02 PROCEDURE — 99213 OFFICE O/P EST LOW 20 MIN: CPT

## 2025-01-08 ENCOUNTER — NON-APPOINTMENT (OUTPATIENT)
Age: 25
End: 2025-01-08

## 2025-01-08 ENCOUNTER — APPOINTMENT (OUTPATIENT)
Dept: RHEUMATOLOGY | Facility: CLINIC | Age: 25
End: 2025-01-08
Payer: COMMERCIAL

## 2025-01-08 VITALS
HEIGHT: 60 IN | SYSTOLIC BLOOD PRESSURE: 108 MMHG | HEART RATE: 78 BPM | TEMPERATURE: 97.2 F | DIASTOLIC BLOOD PRESSURE: 72 MMHG | OXYGEN SATURATION: 98 %

## 2025-01-08 DIAGNOSIS — M25.551 PAIN IN RIGHT HIP: ICD-10-CM

## 2025-01-08 DIAGNOSIS — M24.80 OTHER SPECIFIC JOINT DERANGEMENTS OF UNSPECIFIED JOINT, NOT ELSEWHERE CLASSIFIED: ICD-10-CM

## 2025-01-08 DIAGNOSIS — Q79.62 HYPERMOBILE EHLERS-DANLOS SYNDROME: ICD-10-CM

## 2025-01-08 DIAGNOSIS — Q65.89 OTHER SPECIFIED CONGENITAL DEFORMITIES OF HIP: ICD-10-CM

## 2025-01-08 DIAGNOSIS — M24.859 OTHER SPECIFIC JOINT DERANGEMENTS OF UNSPECIFIED HIP, NOT ELSEWHERE CLASSIFIED: ICD-10-CM

## 2025-01-08 DIAGNOSIS — G89.29 PAIN IN RIGHT HIP: ICD-10-CM

## 2025-01-08 DIAGNOSIS — K52.9 NONINFECTIVE GASTROENTERITIS AND COLITIS, UNSPECIFIED: ICD-10-CM

## 2025-01-08 DIAGNOSIS — M25.552 PAIN IN RIGHT HIP: ICD-10-CM

## 2025-01-08 DIAGNOSIS — S73.192A OTHER SPRAIN OF LEFT HIP, INITIAL ENCOUNTER: ICD-10-CM

## 2025-01-08 DIAGNOSIS — M25.852 OTHER SPECIFIED JOINT DISORDERS, LEFT HIP: ICD-10-CM

## 2025-01-08 DIAGNOSIS — M25.851 OTHER SPECIFIED JOINT DISORDERS, RIGHT HIP: ICD-10-CM

## 2025-01-08 PROCEDURE — 99215 OFFICE O/P EST HI 40 MIN: CPT

## 2025-02-18 ENCOUNTER — NON-APPOINTMENT (OUTPATIENT)
Age: 25
End: 2025-02-18

## 2025-02-20 ENCOUNTER — APPOINTMENT (OUTPATIENT)
Dept: ORTHOPEDIC SURGERY | Facility: CLINIC | Age: 25
End: 2025-02-20